# Patient Record
Sex: MALE
[De-identification: names, ages, dates, MRNs, and addresses within clinical notes are randomized per-mention and may not be internally consistent; named-entity substitution may affect disease eponyms.]

---

## 2021-01-22 ENCOUNTER — HOSPITAL ENCOUNTER (OUTPATIENT)
Dept: HOSPITAL 72 - ULS | Age: 56
Discharge: HOME | End: 2021-01-22
Payer: COMMERCIAL

## 2021-01-22 DIAGNOSIS — M54.2: Primary | ICD-10-CM

## 2021-01-22 DIAGNOSIS — E04.1: ICD-10-CM

## 2021-01-22 PROCEDURE — 76536 US EXAM OF HEAD AND NECK: CPT

## 2021-01-22 NOTE — DIAGNOSTIC IMAGING REPORT
EXAM:  US Thyroid

 

CLINICAL HISTORY: NECK PAIN.   RULE OUT CYST OR NODULES.

 

COMPARISON:  None

 

TECHNIQUE: Ultrasound examination of the thyroid includes grayscale images and color

doppler analysis.

 

FINDINGS:  

 

The right lobe of the thyroid measures 4.3 x 1.2 x 2.1 cm. Echogenicity appears

homogeneous. Two subcentimeter nodules are noted within the right thyroid.

 

The first nodule composition is mixed cystic and solid. The solid components are

isoechoic. It is wider than tall. Margins are well-defined and there are no echogenic

foci. It measures approximately 4 x 3 mm. Overall TI-RADS score of 2.  

 

The second right thyroid nodule is located within the superior gland and measures 3 x

2 mm. It is completely cystic in composition. Borders are well-defined. There are no

echogenic foci. It is wider than tall. TI-RADS score 0. 

 

The left lobe of the thyroid measures 3.7 x 1.2 x 1.5 cm. No discrete thyroid nodules

identified.

 

The isthmus appears unremarkable. 0.3 cm Vascularity appears unremarkable.

 

 

 

IMPRESSION: 

SUBCENTIMETER RIGHT THYROID NODULES, HIGHEST TI-RADS SCORE of 2.